# Patient Record
Sex: MALE | Employment: OTHER | URBAN - METROPOLITAN AREA
[De-identification: names, ages, dates, MRNs, and addresses within clinical notes are randomized per-mention and may not be internally consistent; named-entity substitution may affect disease eponyms.]

---

## 2021-02-09 ENCOUNTER — NEW PATIENT (OUTPATIENT)
Dept: URBAN - METROPOLITAN AREA CLINIC 11 | Facility: CLINIC | Age: 81
End: 2021-02-09

## 2021-02-09 ASSESSMENT — TONOMETRY
OS_IOP_MMHG: 16
OD_IOP_MMHG: 15

## 2021-02-09 ASSESSMENT — VISUAL ACUITY
OD_CC: 20/60-1
OS_PH: 20/30-2
OD_PH: 20/40-2
OS_CC: 20/50-1

## 2021-02-09 NOTE — PATIENT DISCUSSION
Discussed condition with patient and wife. EMM is significant in the right eye.  The membrane in the left eye is not visually significant. I offered PPV, MP in the right eye.

## 2021-02-09 NOTE — PATIENT DISCUSSION
35 minutes spent in face to face dialogue with Mr. Teri Rao and his wife. I have explained that the membrane in his right eye is visually significant and I feel that removing it would be beneficial. All questions and concerns addressed at length and Mr. Teri Rao would like to schedule in the near future.

## 2021-06-29 ENCOUNTER — FOLLOW UP (OUTPATIENT)
Dept: URBAN - METROPOLITAN AREA CLINIC 11 | Facility: CLINIC | Age: 81
End: 2021-06-29

## 2021-06-29 DIAGNOSIS — H35.373: ICD-10-CM

## 2021-06-29 DIAGNOSIS — H43.813: ICD-10-CM

## 2021-06-29 DIAGNOSIS — H43.21: ICD-10-CM

## 2021-06-29 PROCEDURE — 92134 CPTRZ OPH DX IMG PST SGM RTA: CPT

## 2021-06-29 PROCEDURE — 99214 OFFICE O/P EST MOD 30 MIN: CPT

## 2021-06-29 NOTE — PATIENT DISCUSSION
35 minutes spent in face to face dialogue with Mr. Rosa Subramanian and his wife. I am very pleased with his post-op appearance. I have asked that he return on an as needed basis.

## 2021-06-30 ASSESSMENT — VISUAL ACUITY
OD_CC: 20/40-2
OD_PH: 20/40
OS_CC: 20/30+2
OS_PH: 20/25-1

## 2021-06-30 ASSESSMENT — TONOMETRY
OS_IOP_MMHG: 12
OD_IOP_MMHG: 14